# Patient Record
Sex: FEMALE | Race: WHITE | NOT HISPANIC OR LATINO | URBAN - METROPOLITAN AREA
[De-identification: names, ages, dates, MRNs, and addresses within clinical notes are randomized per-mention and may not be internally consistent; named-entity substitution may affect disease eponyms.]

---

## 2017-03-28 ENCOUNTER — OFFICE VISIT - RIVER FALLS (OUTPATIENT)
Dept: FAMILY MEDICINE | Facility: CLINIC | Age: 22
End: 2017-03-28

## 2017-03-28 ENCOUNTER — COMMUNICATION - RIVER FALLS (OUTPATIENT)
Dept: FAMILY MEDICINE | Facility: CLINIC | Age: 22
End: 2017-03-28

## 2017-03-28 ASSESSMENT — MIFFLIN-ST. JEOR: SCORE: 1151.6

## 2017-06-01 ENCOUNTER — OFFICE VISIT - RIVER FALLS (OUTPATIENT)
Dept: FAMILY MEDICINE | Facility: CLINIC | Age: 22
End: 2017-06-01

## 2017-06-01 ENCOUNTER — COMMUNICATION - RIVER FALLS (OUTPATIENT)
Dept: FAMILY MEDICINE | Facility: CLINIC | Age: 22
End: 2017-06-01

## 2017-06-01 ASSESSMENT — MIFFLIN-ST. JEOR: SCORE: 1160.67

## 2017-06-09 ENCOUNTER — OFFICE VISIT - RIVER FALLS (OUTPATIENT)
Dept: FAMILY MEDICINE | Facility: CLINIC | Age: 22
End: 2017-06-09

## 2017-06-09 ASSESSMENT — MIFFLIN-ST. JEOR: SCORE: 1163.39

## 2017-07-07 ENCOUNTER — APPOINTMENT (OUTPATIENT)
Dept: FAMILY MEDICINE | Facility: CLINIC | Age: 22
End: 2017-07-07
Payer: COMMERCIAL

## 2017-07-07 ENCOUNTER — OFFICE VISIT (OUTPATIENT)
Dept: FAMILY MEDICINE | Facility: CLINIC | Age: 22
End: 2017-07-07
Payer: COMMERCIAL

## 2017-07-07 DIAGNOSIS — F19.10 MULTIPLE SUBSTANCE ABUSE (H): ICD-10-CM

## 2017-07-07 DIAGNOSIS — Z86.59 HX OF PSYCHOSIS: ICD-10-CM

## 2017-07-07 DIAGNOSIS — N93.9 VAGINAL BLEEDING: Primary | ICD-10-CM

## 2017-07-07 LAB
BETA HCG QUAL IFA URINE: NEGATIVE
ERYTHROCYTE [DISTWIDTH] IN BLOOD BY AUTOMATED COUNT: 11.7 % (ref 10–15)
HCT VFR BLD AUTO: 43.7 % (ref 35–47)
HGB BLD-MCNC: 15.4 G/DL (ref 11.7–15.7)
MCH RBC QN AUTO: 32 PG (ref 26.5–33)
MCHC RBC AUTO-ENTMCNC: 35.2 G/DL (ref 31.5–36.5)
MCV RBC AUTO: 91 FL (ref 78–100)
MICRO REPORT STATUS: NORMAL
PLATELET # BLD AUTO: 238 10E9/L (ref 150–450)
RBC # BLD AUTO: 4.82 10E12/L (ref 3.8–5.2)
SPECIMEN SOURCE: NORMAL
WBC # BLD AUTO: 6.3 10E9/L (ref 4–11)
WET PREP SPEC: NORMAL

## 2017-07-07 PROCEDURE — 99203 OFFICE O/P NEW LOW 30 MIN: CPT | Performed by: NURSE PRACTITIONER

## 2017-07-07 PROCEDURE — 36415 COLL VENOUS BLD VENIPUNCTURE: CPT | Performed by: NURSE PRACTITIONER

## 2017-07-07 PROCEDURE — 99499 UNLISTED E&M SERVICE: CPT | Performed by: NURSE PRACTITIONER

## 2017-07-07 PROCEDURE — 84703 CHORIONIC GONADOTROPIN ASSAY: CPT | Performed by: NURSE PRACTITIONER

## 2017-07-07 PROCEDURE — 85027 COMPLETE CBC AUTOMATED: CPT | Performed by: NURSE PRACTITIONER

## 2017-07-07 RX ORDER — DIPHENOXYLATE HYDROCHLORIDE AND ATROPINE SULFATE 2.5; .025 MG/1; MG/1
1 TABLET ORAL
COMMUNITY
Start: 2017-07-06

## 2017-07-07 RX ORDER — HYDROXYZINE PAMOATE 25 MG/1
25-50 CAPSULE ORAL
COMMUNITY
Start: 2017-07-06

## 2017-07-07 RX ORDER — OLANZAPINE 2.5 MG/1
2.5 TABLET, FILM COATED ORAL
COMMUNITY
Start: 2017-07-06

## 2017-07-07 RX ORDER — SERTRALINE HYDROCHLORIDE 25 MG/1
25 TABLET, FILM COATED ORAL
COMMUNITY
Start: 2017-07-06

## 2017-07-07 RX ORDER — OLANZAPINE 10 MG/1
10 TABLET ORAL
COMMUNITY
Start: 2017-07-06

## 2017-07-07 ASSESSMENT — MIFFLIN-ST. JEOR: SCORE: 1170.01

## 2017-07-07 NOTE — NURSING NOTE
Chief Complaint   Patient presents with     Vaginal Bleeding       Initial /82 (BP Location: Right arm, Patient Position: Chair, Cuff Size: Adult Regular)  Pulse 88  Temp 98.5  F (36.9  C) (Tympanic)  Resp 18  Wt 99 lb 6.4 oz (45.1 kg)  LMP 06/20/2017  SpO2 98% There is no height or weight on file to calculate BMI.  Medication Reconciliation: complete    Health Maintenance that is potentially due pending provider review:  Patient is here from [place], [facility] physical

## 2017-07-07 NOTE — PATIENT INSTRUCTIONS
Your urine pregnancy was negative   Your blood work was negative   You are not pregnant      You should return to the clinic for a physical, pap and pelvic exam   I did not do this today as I feel would be best once she is physically and mentally stable

## 2017-07-07 NOTE — MR AVS SNAPSHOT
"              After Visit Summary   7/7/2017    Jane Peguero    MRN: [id number]           Patient Information     Date Of Birth          1995        Visit Information        Provider Department      7/7/2017 3:00 PM Alana Macias NP [facility]        Today's Diagnoses     Vaginal bleeding    -  1      Care Instructions    Your urine pregnancy was negative   Your blood work was negative   You are not pregnant      You should return to the clinic for a physical, pap and pelvic exam   I did not do this today as I feel would be best once she is physically and mentally stable          Follow-ups after your visit        Who to contact     If you have questions or need follow up information about today's clinic visit or your schedule please contact [facility] directly at [phone].  Normal or non-critical lab and imaging results will be communicated to you by [employer], letter or phone within 4 business days after the clinic has received the results. If you do not hear from us within 7 days, please contact the clinic through [employer] or phone. If you have a critical or abnormal lab result, we will notify you by phone as soon as possible.  Submit refill requests through [employer] or call your pharmacy and they will forward the refill request to us. Please allow 3 business days for your refill to be completed.          Additional Information About Your Visit        [employer] Information     GEOLID lets you send messages to your doctor, view your test results, renew your prescriptions, schedule appointments and more. To sign up, go to [email or web address] . Click on \"Log in\" on the left side of the screen, which will take you to the Welcome page. Then click on \"Sign up Now\" on the right side of the page.     You will be asked to enter the access code listed below, as well as some personal information. Please follow the directions to create your username and password.     Your " access code is: [email or web address]  Expires: 10/5/2017  3:45 PM     Your access code will  in 90 days. If you need help or a new code, please call your [facility] clinic or [phone].        Care EveryWhere ID     This is your Care EveryWhere ID. This could be used by other organizations to access your [facility] medical records  [id number]        Your Vitals Were     Pulse Temperature Respirations Last Period Pulse Oximetry       88 98.5  F (36.9  C) (Tympanic) 18 2017 98%        Blood Pressure from Last 3 Encounters:   17 120/82    Weight from Last 3 Encounters:   17 99 lb 6.4 oz (45.1 kg)              We Performed the Following     Beta HCG qual IFA urine     CBC with platelets     Wet prep        Primary Care Provider    None Specified       No primary provider on file.        Equal Access to Services     PALMA SINCLAIR : Hadii roscoe dhillon Soavery, waaxda luqadaha, qaybta kaalmada alberta, nolberto ny . So [facility] [phone].    ATENCIÓN: Si habla español, tiene a roman disposición servicios gratuitos de asistencia lingüística. Llame al [phone].    We comply with applicable federal civil rights laws and [place] laws. We do not discriminate on the basis of race, color, national origin, age, disability sex, sexual orientation or gender identity.            Thank you!     Thank you for choosing [facility]  for your care. Our goal is always to provide you with excellent care. Hearing back from our patients is one way we can continue to improve our services. Please take a few minutes to complete the written survey that you may receive in the mail after your visit with us. Thank you!             Your Updated Medication List - Protect others around you: Learn how to safely use, store and throw away your medicines at www.disposemymeds.org.      Notice  As of 2017  3:45 PM    You have not been prescribed any medications.

## 2017-07-07 NOTE — PROGRESS NOTES
"  SUBJECTIVE:                                                    Jane Peguero is a 21 year old female who presents to clinic today for the following health issues:      Patient came in for Catarizm physical  Stated she was bleeding  She believes that she is having a miscarriage  Tells me that she is unsure how far along she is   Reports that she is passing pieces of fetus  Bleeding started on 7/4/2017  It has been off and on  It has been light and then very heavy  Believes LMP was 6/20/2017    [facility] prior to me going in to see the patient   She tells me that the patient was on a 72 hour hold at [facility] for acute psychosis related to \"juan carlos dust\" . She states that there she had similar complaints  I reviewed her care everywhere records which confirmed this   She had a negative pelvic ultrasound 6/7/2017  I have checked labs today       Problem list and histories reviewed & adjusted, as indicated.  Additional history: as documented    Labs reviewed in EPIC    Reviewed and updated as needed this visit by clinical staff       Reviewed and updated as needed this visit by Provider         ROS:  Constitutional, HEENT, cardiovascular, pulmonary, gi and gu systems are negative, except as otherwise noted.    OBJECTIVE:                                                    /82 (BP Location: Right arm, Patient Position: Chair, Cuff Size: Adult Regular)  Pulse 88  Temp 98.5  F (36.9  C) (Tympanic)  Resp 18  Wt 99 lb 6.4 oz (45.1 kg)  LMP 06/20/2017  SpO2 98%  There is no height or weight on file to calculate BMI.  GENERAL APPEARANCE: healthy, alert and no distress  RESP: lungs clear to auscultation - no rales, rhonchi or wheezes  CV: regular rates and rhythm, normal S1 S2, no S3 or S4 and no murmur, click or rub  ABDOMEN: soft, nontender, without hepatosplenomegaly or masses and bowel sounds normal  SKIN: pale and clammy   PSYCH: mentation appears abnormal denies insight into " recent hospitalization, affect is withdrawn and flat, does not track well     Diagnostic test results:  Diagnostic Test Results:  Results for orders placed or performed in visit on 07/07/17   Beta HCG qual IFA urine   Result Value Ref Range    Beta HCG Qual IFA Urine Negative NEG   CBC with platelets   Result Value Ref Range    WBC 6.3 4.0 - 11.0 10e9/L    RBC Count 4.82 3.8 - 5.2 10e12/L    Hemoglobin 15.4 11.7 - 15.7 g/dL    Hematocrit 43.7 35.0 - 47.0 %    MCV 91 78 - 100 fl    MCH 32.0 26.5 - 33.0 pg    MCHC 35.2 31.5 - 36.5 g/dL    RDW 11.7 10.0 - 15.0 %    Platelet Count 238 150 - 450 10e9/L   Wet prep   Result Value Ref Range    Specimen Description Vagina     Wet Prep       Canceled, Test credited  Test canceled by physician      Micro Report Status FINAL 07/07/2017         ASSESSMENT/PLAN:                                                      1. Vaginal bleeding    2. Hx of psychosis    3. Multiple substance abuse      Patient came in for [facility] and was concerned that she was pregnant and having a miscarriage   Recent hx of psych admission due to psychosis  At treatment center now   I provided reassurance that she is not pregnant  I opted not to do a pelvic exam today in light of her current mental status  I have recommend that this be done in the near future     Patient Instructions   Your urine pregnancy was negative   Your blood work was negative   You are not pregnant      You should return to the clinic for a physical, pap and pelvic exam   I did not do this today as I feel would be best once she is physically and mentally stable      Alana Macias NP  [facility]

## 2017-07-10 ENCOUNTER — NURSE TRIAGE (OUTPATIENT)
Dept: NURSING | Facility: CLINIC | Age: 22
End: 2017-07-10

## 2017-07-11 NOTE — TELEPHONE ENCOUNTER
Clinic Action Needed: None  Reason for Call: Mother called to inquire if patient was in the hospital.  According to patient's chart, there is no record of any hospitalization.  Mother was advised to call another chandni system.  Routed to: HANS Stanley RN  [facility]  [phone]

## 2018-04-17 ENCOUNTER — OFFICE VISIT - RIVER FALLS (OUTPATIENT)
Dept: FAMILY MEDICINE | Facility: CLINIC | Age: 23
End: 2018-04-17
Payer: COMMERCIAL

## 2018-04-17 ASSESSMENT — MIFFLIN-ST. JEOR: SCORE: 1310.36

## 2018-05-01 ENCOUNTER — OFFICE VISIT - RIVER FALLS (OUTPATIENT)
Dept: FAMILY MEDICINE | Facility: CLINIC | Age: 23
End: 2018-05-01
Payer: COMMERCIAL

## 2018-05-01 ASSESSMENT — MIFFLIN-ST. JEOR: SCORE: 1294.94

## 2018-05-23 ENCOUNTER — OFFICE VISIT - RIVER FALLS (OUTPATIENT)
Dept: FAMILY MEDICINE | Facility: CLINIC | Age: 23
End: 2018-05-23
Payer: COMMERCIAL

## 2018-05-23 ASSESSMENT — MIFFLIN-ST. JEOR: SCORE: 1301.29

## 2018-07-11 ENCOUNTER — OFFICE VISIT - RIVER FALLS (OUTPATIENT)
Dept: FAMILY MEDICINE | Facility: CLINIC | Age: 23
End: 2018-07-11
Payer: COMMERCIAL

## 2018-07-11 ASSESSMENT — MIFFLIN-ST. JEOR: SCORE: 1293.69

## 2018-10-03 ENCOUNTER — OFFICE VISIT - RIVER FALLS (OUTPATIENT)
Dept: FAMILY MEDICINE | Facility: CLINIC | Age: 23
End: 2018-10-03
Payer: COMMERCIAL

## 2018-10-10 ENCOUNTER — OFFICE VISIT - RIVER FALLS (OUTPATIENT)
Dept: FAMILY MEDICINE | Facility: CLINIC | Age: 23
End: 2018-10-10
Payer: COMMERCIAL

## 2018-10-10 ASSESSMENT — MIFFLIN-ST. JEOR: SCORE: 1239.26

## 2018-11-29 ENCOUNTER — OFFICE VISIT - RIVER FALLS (OUTPATIENT)
Dept: FAMILY MEDICINE | Facility: CLINIC | Age: 23
End: 2018-11-29
Payer: COMMERCIAL

## 2019-05-03 ENCOUNTER — COMMUNICATION - RIVER FALLS (OUTPATIENT)
Dept: FAMILY MEDICINE | Facility: CLINIC | Age: 24
End: 2019-05-03

## 2019-05-06 ENCOUNTER — COMMUNICATION - RIVER FALLS (OUTPATIENT)
Dept: FAMILY MEDICINE | Facility: CLINIC | Age: 24
End: 2019-05-06

## 2019-06-28 VITALS
TEMPERATURE: 98.5 F | DIASTOLIC BLOOD PRESSURE: 82 MMHG | BODY MASS INDEX: 17.61 KG/M2 | RESPIRATION RATE: 18 BRPM | SYSTOLIC BLOOD PRESSURE: 120 MMHG | HEIGHT: 63 IN | WEIGHT: 99.4 LBS | HEART RATE: 88 BPM | OXYGEN SATURATION: 98 %

## 2021-06-16 PROBLEM — F29 PSYCHOSIS (H): Status: ACTIVE | Noted: 2018-11-05

## 2021-06-16 PROBLEM — F32.A DEPRESSION: Status: ACTIVE | Noted: 2018-11-05

## 2022-02-11 VITALS
DIASTOLIC BLOOD PRESSURE: 72 MMHG | BODY MASS INDEX: 17.79 KG/M2 | SYSTOLIC BLOOD PRESSURE: 112 MMHG | TEMPERATURE: 96 F | HEIGHT: 63 IN | SYSTOLIC BLOOD PRESSURE: 112 MMHG | HEIGHT: 63 IN | HEART RATE: 64 BPM | WEIGHT: 102.4 LBS | BODY MASS INDEX: 18.14 KG/M2 | HEART RATE: 62 BPM | DIASTOLIC BLOOD PRESSURE: 64 MMHG | WEIGHT: 100.4 LBS

## 2022-02-12 VITALS
WEIGHT: 133.4 LBS | DIASTOLIC BLOOD PRESSURE: 78 MMHG | HEIGHT: 63 IN | WEIGHT: 135.4 LBS | BODY MASS INDEX: 23.39 KG/M2 | TEMPERATURE: 99 F | BODY MASS INDEX: 23.64 KG/M2 | HEART RATE: 82 BPM | DIASTOLIC BLOOD PRESSURE: 80 MMHG | OXYGEN SATURATION: 98 % | HEART RATE: 90 BPM | HEART RATE: 70 BPM | SYSTOLIC BLOOD PRESSURE: 104 MMHG | BODY MASS INDEX: 23.99 KG/M2 | HEIGHT: 63 IN | SYSTOLIC BLOOD PRESSURE: 118 MMHG | TEMPERATURE: 98.7 F | SYSTOLIC BLOOD PRESSURE: 118 MMHG | DIASTOLIC BLOOD PRESSURE: 68 MMHG | HEIGHT: 63 IN | TEMPERATURE: 98.8 F | WEIGHT: 132 LBS

## 2022-02-12 VITALS
HEART RATE: 85 BPM | WEIGHT: 103 LBS | TEMPERATURE: 98.8 F | HEIGHT: 63 IN | SYSTOLIC BLOOD PRESSURE: 126 MMHG | DIASTOLIC BLOOD PRESSURE: 83 MMHG | BODY MASS INDEX: 18.25 KG/M2

## 2022-02-12 VITALS
OXYGEN SATURATION: 97 % | HEIGHT: 62 IN | BODY MASS INDEX: 24.4 KG/M2 | HEART RATE: 85 BPM | DIASTOLIC BLOOD PRESSURE: 76 MMHG | WEIGHT: 132.6 LBS | SYSTOLIC BLOOD PRESSURE: 108 MMHG | TEMPERATURE: 98.3 F

## 2022-02-12 VITALS
HEIGHT: 62 IN | SYSTOLIC BLOOD PRESSURE: 130 MMHG | HEART RATE: 97 BPM | SYSTOLIC BLOOD PRESSURE: 148 MMHG | DIASTOLIC BLOOD PRESSURE: 80 MMHG | OXYGEN SATURATION: 97 % | WEIGHT: 122 LBS | BODY MASS INDEX: 21.61 KG/M2 | TEMPERATURE: 98.5 F | BODY MASS INDEX: 22.19 KG/M2 | DIASTOLIC BLOOD PRESSURE: 88 MMHG | HEART RATE: 88 BPM | TEMPERATURE: 100 F | WEIGHT: 120.6 LBS

## 2022-02-16 NOTE — LETTER
(Inserted Image. Unable to display)     July 08, 2019      EZRA CASE  [address], [place] [id number]          Dear EZRA,      Thank you for selecting [facility] (previously [facility]) for your healthcare needs.      Our records indicate you are due for the following services:     Follow-up office visit.      To schedule an appointment or if you have further questions, please contact your primary clinic:   [facility]       [phone]   [facility]       [phone]              [facility]     [phone]      Powered by [employer]    Sincerely,    Yelitza Champagne MD

## 2022-02-16 NOTE — PROGRESS NOTES
Patient:   EZRA CASE            MRN: [id number]            FIN: [id number]               Age:   21 years     Sex:  Female     :  1995   Associated Diagnoses:   Abdominal and pelvic pain   Author:   Chet Lindsey MD      Visit Information      Date of Service: 2017 01:26 pm  Performing Location: [facility]  Encounter#: [id number]      Primary Care Provider (PCP):  Ronal Peters MD    NPI# [id number]      Referring Provider:  Chet Lindsey MD    NPI# [id number]      Chief Complaint   2017 1:32 PM CDT     Consult per ZIM to discuss LLQ discomfort off and on past 1 month.  Also noticing increased urinary frequency at times, and abnormal vaginal discharge.      Interval History   The patient is seen in consultation for Dr. Peters regarding lower abdominal pain.  She has had issues since having termination of pregnancy back in February.  She presented with pain following that procedure and was treated with antibiotics for presumed endometritis.  She has a second round of antibiotics when she had a return of pain.  She seemed to be doing fine until around one or two weeks ago when she began having pain and increased vaginal discharge.  She has been seen by Dr. Peters on a couple of occasions and was sent for ultrasound on 2017 which was normal.  The patient denies fever.  The discharge was whitish in color and increased.  She has not been sexually active since her .  She ranks her pain 8/10 and is mostly lower abdomen and probably a little bit off to the left side.  A couple of days after the pain started she began having some looser stools combined with constipation at times.  She also feels a bit nauseated.       Review of Systems   Review  of systems is negative except as documented under interval history.      Health Status   Allergies:    Allergic Reactions (Selected)  No Known Medication Allergies   Medications:  (Selected)    Prescriptions  Prescribed  Augmentin 875 mg oral tablet: 1 tab(s), PO, q12hr, # 28 tab(s), 0 Refill(s), Type: Maintenance, Pharmacy: [id number], 1 tab(s) po q12 hrs,x14 day(s)   Problem list:    All Problems  Tobacco user / SNOMED CT 871832693 / Probable      Histories   Past Medical History:    No active or resolved past medical history items have been selected or recorded.   Family History:    High blood pressure  Father  Grandfather (M)     Procedure history:    No previous hospitalizations.   Social History:        Tobacco Assessment            Current every day smoker, Cigarettes, 5 per day.        Physical Examination   Vital Signs   6/9/2017 1:32 PM CDT Temperature Tympanic 98.8 DegF    Peripheral Pulse Rate 85 bpm    HR Method Electronic    Systolic Blood Pressure 126 mmHg    Diastolic Blood Pressure 83 mmHg    Mean Arterial Pressure 97 mmHg    BP Site Right arm    BP Method Electronic      Gastrointestinal:       Abdomen: Abdomen is soft and minimally tender.  There is no guarding..    Gynecologic:  External genitalia, vagina, and cervix are normal.  There is a small amount of blood in the vaginal vault.  Uterus is small and nontender with no adnexal masses..       Impression and Plan   Diagnosis     Abdominal and pelvic pain (XTF05-UO R10).     Lower abdominal pain combined with increased vaginal discharge with some bleeding today suggests possible endometritis..     Plan:  The patient will be treated with another round of antibiotics.  I am not highly suspicious for anything severe as far as endometritis but just to be on the safe side we will be aggressive with antibiotics.  She will return next week if not improving after a few days of antibiotics but Augmentin was sent into her pharmacy.  She will not be placed on contraceptives at her request..    Patient Instructions:       Counseled: Regarding diagnosis, Regarding treatment, Regarding medications, Verbalized understanding.

## 2022-02-16 NOTE — PROGRESS NOTES
Patient:   EZRA CASE            MRN: [id number]            FIN: [id number]               Age:   21 years     Sex:  Female     :  1995   Associated Diagnoses:   Abdominal pain; Vaginal discharge   Author:   Ronal Peters MD      Chief Complaint   2017 2:56 PM CDT     c/o foul oder, abdominal pain.      History of Present Illness   see chief complaint as noted above and confirmed with the patient   21 year old female presents with lower abdominal pain. Has had fever and chill. denies sexual activities. she had ellective termination of pregnancy in [facility].  she did have post procedure testing for std that was negative.No sexual activity since.  Has had some rashes on her left wrist. She is not currently on BCP her last period was last month.     also notes trouble with her speech.  she has trouble repeating words and also with enuciation.  she wonders if there is somewhere she could go to get help      Review of Systems   Constitutional:  Fever, Chills.    Gastrointestinal:       Abdominal pain: Bilateral, The pain is mild.    Gynecologic:  Last menstrual period:  2017, Vaginal odor.         Vaginal discharge: Small amount.    Musculoskeletal:  No back pain.    Integumentary:  Rash.    Neurologic:  No headache.              Health Status   Allergies:    Allergic Reactions (Selected)  No Known Medication Allergies   Medications:  (Selected)      Problem list:    All Problems  Tobacco user / SNOMED CT 906896399 / Probable      Histories   Past Medical History:    No active or resolved past medical history items have been selected or recorded.   Family History:    High blood pressure  Father  Grandfather (M)     Procedure history:    No previous hospitalizations.   Social History:        Tobacco Assessment            Current every day smoker, Cigarettes, 5 per day.        Physical Examination   Vital Signs   2017 2:56 PM CDT Peripheral Pulse Rate 64 bpm    Systolic Blood Pressure 112 mmHg     Diastolic Blood Pressure 72 mmHg    Mean Arterial Pressure 85 mmHg      Measurements from flowsheet : Measurements   6/1/2017 2:56 PM CDT Height Measured - Standard 62.5 in    Weight Measured - Standard 102.4 lb    BSA 1.43 m2    Body Mass Index 18.43 kg/m2      General:  Alert and oriented, No acute distress.    Eye:  Pupils are equal, round and reactive to light, Normal conjunctiva.    HENT:  Oral mucosa is moist.    Neck:  Supple.    Respiratory:  Respirations are non-labored.    Cardiovascular:  Normal rate, Regular rhythm, No edema.    Gastrointestinal:  Non-distended.    Musculoskeletal:  Normal gait.    Integumentary:  Warm, No rash.    Psychiatric:  Cooperative, Appropriate mood & affect, Normal judgment.       Review / Management   Results review:  Lab results   6/1/2017 3:26 PM CDT U Pregnancy Test Negative    UA Color Yellow    UA Clarity Clear    UA pH 6.0    UA Specific Gravity 1.010    UA Glucose Negative mg/dL    UA Bilirubin Negative    UA Ketones Negative mg/dL    Urine Occult Blood Negative    UA Protein Negative mg/dL    UA Nitrite Negative    UA Leukocyte Esterase Trace    UA Urobilinogen Normal    UA Epithelial Cells Few    UA Renal Epithelial Cells Few    UA Mucous Present    UA WBC 3-5    UA RBC 0-2    UA Bacteria Few   .       Impression and Plan   Diagnosis     Abdominal pain (QVO26-RY R10.9).     Vaginal discharge (PZP98-RE N89.8).     Course:  discussed with her symptoms, risk factors, birth control but i am unsure if she understood well.  will have her be reseen and reinforce good care..    Plan:  Will have her get a pelvis ultrasound and follow up with Imani Garcia.  I, Willow Nicholson [facility], acted solely as a scribe for, and in the presence of Dr. Ronal Peters who performed the service..

## 2022-02-16 NOTE — TELEPHONE ENCOUNTER
Entered by Elmer Kelley CMA on May 06, 2019 10:36:51 AM CDT  ---------------------  From: Elmer Kelley CMA   To: [place], [place]    Sent: 5/6/2019 10:36:47 AM CDT  Subject: Medication Management     ** Not Approved: Patient has requested refill too soon, Pt given #84 5-3-19 **  ethinyl estradiol-levonorgestrel (AVIANE 0.1-0.02MG TABLET)  TAKE ONE TABLET BY MOUTH EVERY DAY AT 8AM  Qty:  30 tab(s)        Days Supply:  28        Refills:  0          Substitutions Allowed     Route To Pharmacy - [place], [place]   Note from Pharmacy:  FACILITY REQUESTING REFILL. PATIENT IS OUT AND NEEDS THIS DELIVERED ASAP. 2ND REQUEST  Signed by Elmer Kelley CMA            ** Patient matched by Elmer Kelley CMA on 5/6/2019 10:35:58 AM CDT **      ------------------------------------------  From: [place], [place]  To: Ronal Peters MD  Sent: May 3, 2019 1:06:01 PM CDT  Subject: Medication Management  Due: May 4, 2019 1:06:01 PM CDT    ** On Hold Pending Signature **  Drug: ethinyl estradiol-levonorgestrel (Aviane 100 mcg-20 mcg oral tablet)  TAKE ONE TABLET BY MOUTH EVERY DAY AT 8AM  Quantity: 30 EA       Days Supply: 0         Refills: 0  Substitutions Allowed  Notes from Pharmacy:     Dispensed Drug: ethinyl estradiol-levonorgestrel (Aviane 100 mcg-20 mcg oral tablet)  TAKE ONE TABLET BY MOUTH EVERY DAY AT 8AM  Quantity: 30 tab(s)     Days Supply: 28        Refills: 0  Substitutions Allowed  Notes from Pharmacy: FACILITY REQUESTING REFILL. PATIENT IS OUT AND NEEDS THIS DELIVERED ASAP. 2ND REQUEST  ------------------------------------------

## 2022-02-16 NOTE — PROGRESS NOTES
Patient:   EZRA CASE            MRN: [id number]            FIN: [id number]               Age:   21 years     Sex:  Female     :  1995   Associated Diagnoses:   Vaginal discharge   Author:   Ronal Peters MD      Chief Complaint   3/28/2017 9:15 AM CDT    c/o yellow discharge, fever with chills, sweating, left tonsil is swollen x 2 weeks      History of Present Illness   see chief complaint as noted above and confirmed with the patient   21 year old female presenting with thick yellow discharge, discharge has mucus in it and is foul smelling.  Has had some slight tenderness in left side of abdomen. Had an  in [facility]. She had  done by pill. Symptoms started 2 weeks ago. She has had a fever with chills.      Review of Systems   Constitutional:  Fever, Chills, Sweats.    Ear/Nose/Mouth/Throat:  Negative.    Respiratory:  Negative.    Cardiovascular:  Negative.    Gastrointestinal:       Abdominal pain: Left, The pain is mild, Characterized as ( Intermittent ).    Gynecologic:  Vaginal odor, Vaginal itching.         Vaginal discharge: Large amount, Yellow, Foul smelling, Thick.    Musculoskeletal:  No back pain.    Integumentary:  No rash.    Neurologic:  No headache.              Health Status   Allergies:    Allergic Reactions (Selected)  No Known Medication Allergies   Medications:  (Selected)      Problem list:    All Problems  Tobacco user / SNOMED CT [id number] / Probable      Histories   Past Medical History:    No active or resolved past medical history items have been selected or recorded.   Family History:    High blood pressure  Father  Grandfather (M)     Procedure history:    No previous hospitalizations.   Social History:        Tobacco Assessment            Current every day smoker, Cigarettes, 5 per day.        Physical Examination   Vital Signs   3/28/2017 9:15 AM CDT Temperature Tympanic 96.0 DegF  LOW    Peripheral Pulse Rate 62 bpm    Systolic Blood Pressure 112 mmHg     Diastolic Blood Pressure 64 mmHg    Mean Arterial Pressure 80 mmHg      Measurements from flowsheet : Measurements   3/28/2017 9:15 AM CDT Height Measured - Standard 62.5 in    Weight Measured - Standard 100.4 lb    BSA 1.42 m2    Body Mass Index 18.07 kg/m2      General:  Alert and oriented, No acute distress.    Eye:  Pupils are equal, round and reactive to light, Normal conjunctiva.    HENT:  Normocephalic, Tympanic membranes are clear, Oral mucosa is moist, No pharyngeal erythema.    Neck:  Supple, Non-tender, No lymphadenopathy.    Respiratory:  Lungs are clear to auscultation, Respirations are non-labored.    Cardiovascular:  Normal rate, Regular rhythm, No edema.    Gastrointestinal:  Soft, Non-tender, Non-distended, No organomegaly.    Genitourinary:  No inguinal tenderness, No lesions, Mild tenderness in the lower midline no masses are appreciated on bimanual.         Vagina: Mucosa ( Within normal limits ).         Cervix: Lesions.         Ovaries: Within normal limits.         Adnexa: Not tender.    Musculoskeletal:  Normal range of motion, Normal strength, No swelling, Normal gait.    Integumentary:  Warm, No rash.    Neurologic:  Alert, Oriented.    Psychiatric:  Cooperative, Appropriate mood & affect, Normal judgment.       Review / Management   Results review:  Lab results   3/28/2017 10:13 AM CDT Wet Prep Yeast None Seen    Wet Prep Trichomonas None Seen    Wet Prep Clue Cells None Seen   .       Impression and Plan   Diagnosis     Vaginal discharge (VKF14-CY N89.8).     Course:  No real pain, no fever or chills.    Plan:  Given shot of Ceftriaxone in office and sent in a prescription for azithromycin 250mg 4 tablets once. Sent specimens to lab, will call her with results.  Willow BURGESS [facility], acted solely as a scribe for, and in the presence of Dr. Ronal Peters who performed the service..    Orders     Orders (Selected)   Outpatient Orders  Ordered  Chlamydia and Gonoccocus Probe  (Request): Vaginal discharge  Thin Prep PAP (Request): Vaginal discharge  Completed  Wet Prep Vaginal (Request): Priority: Urgent, Vaginal discharge.

## 2022-02-16 NOTE — PROGRESS NOTES
Patient:   EZRA CASE            MRN: [id number]            FIN: [id number]               Age:   22 years     Sex:  Female     :  1995   Associated Diagnoses:   Acute sinusitis; History of psychosis   Author:   Ronal Peters MD      Chief Complaint   2018 11:03 AM CDT   Sinus pressure, productive cough, headaches, bodyaches, started at begining of the month.      History of Present Illness   This 22 year old is here because of several days of headache, cough, and lots of sinus pressure.  She says it really started a month ago but has gotten worse especially the last four or five days.  Now she is having headache, green-yellow discharge from her nose, and she is also cough.  She hasn t been short of breath.  The patient confirms that she had significant health issues last summer including some psychosis and drug abuse.  She is no longer abusing drugs and her mood has been good.           Review of Systems   Constitutional:  Negative except as documented in history of present illness.    Respiratory:  Negative except as documented in history of present illness.    Gastrointestinal:  Negative except as documented in history of present illness.    Integumentary:  No rash.       Health Status   Allergies:    Allergic Reactions (Selected)  No Known Medication Allergies   Medications:  (Selected)   Prescriptions  Prescribed  amoxicillin 875 mg oral tablet: 1 tab(s) ( 875 mg ), PO, BID, # 20 tab(s), 0 Refill(s), Type: Maintenance, Pharmacy: [facility], 1 tab(s) po bid,x10 day(s)  predniSONE 10 mg oral tablet: 1 tab(s) ( 10 mg ), PO, Daily, # 5 tab(s), 0 Refill(s), Type: Maintenance, Pharmacy: [facility], 1 tab(s) po daily,x5 day(s)   Problem list:    All Problems (Selected)  Tobacco user / 960270226 / Probable  Psychotic disorder / 705269037 / Confirmed  Cannabis use with psychotic disorder / 6946207983 / Confirmed  Alcohol use disorder, severe, dependence / 8390812482 /  Confirmed  Chemical dependency / 768957965 / Confirmed  Polysubstance abuse / 5233241736 / Confirmed      Histories   Past Medical History:    Active  Psychotic disorder (603926291)  Cannabis use with psychotic disorder (3412371458)  Alcohol use disorder, severe, dependence (6907241299)  Chemical dependency (162230641)  Polysubstance abuse (2623341032)  Resolved  Inpatient stay (409419095): Onset on 7/14/2017 at 22 years.  Resolved on 8/7/2017 at 22 years.  Comments:  8/22/2017 CDT 7:49 AM Gemini Lawrence  @[facility], [place] - Severe episode of recurrent major depressive disorder, with psychotic features.  Inpatient stay (711107754): Onset on 6/21/2017 at 21 years.  Resolved on 7/6/2017 at 21 years.  Comments:  7/17/2017 CDT 8:24 AM Gemini Lawrence  @[facility] - Psychotic disorder not otherwise specified   Family History:    High blood pressure  Father  Grandfather (M)     Procedure history:    No previous hospitalizations.   Social History:        Tobacco Assessment            Current every day smoker, Cigarettes, 5 per day.        Physical Examination   Vital Signs   4/17/2018 11:03 AM CDT Temperature Tympanic 98.8 DegF    Peripheral Pulse Rate 70 bpm    HR Method Electronic    Systolic Blood Pressure 104 mmHg    Diastolic Blood Pressure 80 mmHg    Mean Arterial Pressure 88 mmHg    BP Site Right arm    BP Method Manual    Oxygen Saturation 98 %      Measurements from flowsheet : Measurements   4/17/2018 11:03 AM CDT Height Measured - Standard 62.5 in    Weight Measured - Standard 135.4 lb    BSA 1.64 m2    Body Mass Index 24.37 kg/m2      General:  Alert and oriented, No acute distress.    HENT:  percussion tenderness right maxillary sinus.    Neck:  Non-tender, No lymphadenopathy.    Respiratory:  Lungs are clear to auscultation, Respirations are non-labored.    Integumentary:  Warm, No rash.    Psychiatric:  Cooperative, Appropriate mood & affect, Normal judgment.       Impression and  Plan   Diagnosis     Acute sinusitis (HLG28-XG J01.90).     History of psychosis (YVT26-HK Z86.59).     Orders     Orders (Selected)   Prescriptions  Prescribed  amoxicillin 875 mg oral tablet: 1 tab(s) ( 875 mg ), PO, BID, # 20 tab(s), 0 Refill(s), Type: Maintenance, Pharmacy: [facility], 1 tab(s) po bid,x10 day(s)  predniSONE 10 mg oral tablet: 1 tab(s) ( 10 mg ), PO, Daily, # 5 tab(s), 0 Refill(s), Type: Maintenance, Pharmacy: [facility], 1 tab(s) po daily,x5 day(s).     I am going to use a little bit of prednisone because of her long symptoms.  I have discussed potential allergies that can occur and I relate these symptoms.  We also talked about side effects of prednisone but there is no psychosis or significant mood problems.  She will use some amoxicillin along with steam such as a Neti pot..

## 2022-02-16 NOTE — PROGRESS NOTES
Patient:   EZRA CASE            MRN: [id number]            FIN: [id number]               Age:   22 years     Sex:  Female     :  1995   Associated Diagnoses:   Acute vaginitis; Encounter for screening for infections with a predominantly sexual mode of transmission; Tobacco abuse counseling   Author:   Imani Stoner      Visit Information      Date of Service: 2018 08:20 am  Performing Location: [facility]  Encounter#: [id number]      Primary Care Provider (PCP):  Ronal Peters MD# [id number]   Visit type:  General concerns.    Source of history:  Self.    History limitation:  None.       Chief Complaint   2018 8:25 AM CDT     Feels like she has a yeast infection.        History of Present Illness   Ezra is here with complaints of itchy, chunky, white vaginal discharge x 5 days.  She recently used antibiotics.  She has had vaginal yeast in the past and thinks she may have a yeast infection again.  She is sexually active.  Had elective  in 2017.  Does not desire pregnancy in the next year.  Uses condoms sometimes, not always.    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  Smokes 3-4 packs of cigarettes per week.  Started many years ago.  Would like to use nicotine patch to help her quit.      Review of Systems   ROS negative except as noted in HPI.      Health Status   Allergies:    Allergic Reactions (Selected)  No Known Medication Allergies   Problem list:    All Problems  Cannabis use with psychotic disorder / SNOMED CT 1027808415 / Confirmed  Chemical dependency / SNOMED CT 738738007 / Confirmed  Polysubstance abuse / SNOMED CT 7214767276 / Confirmed  Psychotic disorder / SNOMED CT 316261415 / Confirmed  Alcohol use disorder, severe, dependence / SNOMED CT 7046768308 / Confirmed  Tobacco user / SNOMED CT 617641309 / Probable  Resolved: Inpatient stay / SNOMED CT 269658693  Resolved: Inpatient stay / SNOMED  CT 199843778   Medications:  (Selected)   Prescriptions  Prescribed  Diflucan 150 mg oral tablet: 1 tab(s) ( 150 mg ), PO, Once, # 1 tab(s), 1 Refill(s), Type: Soft Stop, Pharmacy: [facility] [id number] [facility], 1 tab(s) po once,    Medications          *denotes recorded medication          Diflucan 150 mg oral tablet: 150 mg, 1 tab(s), PO, Once, 1 tab(s), 1 Refill(s).        Histories   Past Medical History:    Active  Psychotic disorder (307103156)  Cannabis use with psychotic disorder (7397823688)  Alcohol use disorder, severe, dependence (2068758029)  Chemical dependency (032378116)  Polysubstance abuse (7397046161)  Resolved  Inpatient stay (442464828): Onset on 7/14/2017 at 22 years.  Resolved on 8/7/2017 at 22 years.  Comments:  8/22/2017 CDT 7:49 AM Gemini Lawrence  @[facility], [place] - Severe episode of recurrent major depressive disorder, with psychotic features.  Inpatient stay (512880026): Onset on 6/21/2017 at 21 years.  Resolved on 7/6/2017 at 21 years.  Comments:  7/17/2017 CDT 8:24 AM Gemini Lawrence  @[facility] - Psychotic disorder not otherwise specified   Family History:    High blood pressure  Father  Grandfather (M)     Procedure history:    No previous hospitalizations.   Social History:        Tobacco Assessment            Current every day smoker, Cigarettes, 5 per day.      Employment and Education Assessment            Employed, Work/School description: works in a photo shop.  Previous employment/school: going to school to               become dental hygenist.      Sexual Assessment            Sexually active: Yes.  Contraceptive Use Details: Condoms.  Other contraceptive use: h/o ocp use.        Physical Examination   Last Menstrual Period: 4/13/2018     Vital Signs   5/1/2018 8:25 AM CDT Temperature Tympanic 98.7 DegF    Peripheral Pulse Rate 90 bpm    Pulse Site Radial artery    HR Method Manual    Systolic Blood Pressure 118 mmHg    Diastolic Blood Pressure 78 mmHg    Mean  Arterial Pressure 91 mmHg    BP Site Right arm    BP Method Manual      Measurements from flowsheet : Measurements   5/1/2018 8:25 AM CDT Height Measured - Standard 62.5 in    Weight Measured - Standard 132 lb    BSA 1.62 m2    Body Mass Index 23.76 kg/m2      General:  Alert and oriented, No acute distress.    Eye:  Normal conjunctiva.    HENT:  Normocephalic, Normal hearing.    Respiratory:  Respirations are non-labored.    Cardiovascular:  Normal rate.    Genitourinary:          Labia: Bilateral, Minora, Erythema.         Vagina: Discharge ( White ), Mucosa ( Erythema ), No bleeding, No laceration, No lesions, No mass.         Cervix: Discharge.    Musculoskeletal:  Normal gait.    Integumentary:  Warm, Dry.    Neurologic:  Normal sensory.    Psychiatric:  Cooperative, Appropriate mood & affect, Normal judgment.       Health Maintenance   due to Tetanus shot      Review / Management   Results review      Impression and Plan   Diagnosis     Acute vaginitis (GRI07-DF N76.0).     Encounter for screening for infections with a predominantly sexual mode of transmission (KCD86-BU Z11.3).     Tobacco abuse counseling (GZS43-BO Z71.6).     Plan   Not interested in discussing contraceptive options.  Will continue condom use.  Wet prep and GC, CT swab collected today.  Rx for Diflucan.  Advised to eat yogurt daily.  Tetanus vaccine today.  Gave and reviewed tobacco cessation folder.  RTC to see primary provider for nicotine patch.

## 2022-02-16 NOTE — TELEPHONE ENCOUNTER
"---------------------  From: Jacqueline Clay (eRx Pool ([id number]_[facility]))   To: [facility] Message Pool ([address]_[place] [facility] [place]);     Sent: 5/1/2019 3:00:49 PM CDT  Subject: ocp refill?     PCP: SHARON, but last ocp prescribed by [facility] when seen on 7/11/18 for px/contraception. I just received fax from [facility] wanting refill of Aviane ocp that was \"previously prescribed by Fahad Greene PA-C\". Renetta was also sent years worth on 7/11/18, unsure of what ocp she is supposed to be taking (or are they the same thing?). Please advise.---------------------  From: Noemy Germain CMA ([facility] Message Pool ([address]))   To: Yelitza Champagne MD;     Sent: 5/1/2019 3:01:37 PM CDT  Subject: FW: ocp refill?I think pt switched pharmacies so just needs new Rx sent to [facility]. Okay for Aviane or Renetta?---------------------  From: Yelitza Champagne MD   To: [facility] Message Pool ([address]_[place] [facility] [place]);     Sent: 5/3/2019 2:27:07 PM CDT  Subject: RE: ocp refill?     please clarify which OCP pt is on, then ok to send 3 month supply and invite pt to rtc in July for follow up, thanksSpoke to patient at 1436. Patient states she is taking Aviane and [facility] I informed patient to rtc in July, she verbalized understanding.  "

## 2022-02-16 NOTE — PROGRESS NOTES
Patient:   EZRA CASE            MRN: [id number]            FIN: [id number]               Age:   23 years     Sex:  Female     :  1995   Associated Diagnoses:   RENETTA (generalized anxiety disorder); Mild major depression; Bronchitis; RENETTA (generalized anxiety disorder); Bronchitis; Mild major depression   Author:   Maritza Valle      Visit Information      Date of Service: 10/03/2018 02:51 pm  Performing Location: [facility]  Encounter#: [id number]      Chief Complaint   10/3/2018 3:11 PM CDT    anxiety. URI      History of Present Illness   Chief complaint reviewed and confirmed with patient. Pt reports increasing axiety over the last month. Reports nausea and vomiting related to anxiety for past 2 weeks, improving over last 3 days, now able to keep down food. Reports home life with mother is strained, moved out of mom's house on  and in with aunt. She reports feeling safe with her aunt. Reports waking up off and on during the night, mind racing.  She has used meds in past for anxiety. Was hospitalized last summer for psychosis, within 6 months after .  She restarted  vistaril she was given at hospitalization   .  No current thoughts of self harm.  See PHQ 9 and RENETTA 7.     Recently quit smoking, 3 days without cigarettes. Additionally reports a dry cough for last month that is now improving. She is running a fever, no ear pain, no sore throat, no OTC for cough      Review of Systems   Constitutional:  Chills, Weakness.    Eye   Ear/Nose/Mouth/Throat:  Negative.    Respiratory:  Negative except as documented in history of present illness.    Cardiovascular:  Negative.    Gastrointestinal:  Negative except as documented in history of present illness.    Genitourinary:  Negative.    Musculoskeletal:  Negative.    Integumentary:  Negative.    Psychiatric:  Negative except as documented in history of present illness.       Health Status   Allergies:    Allergic Reactions  (All)  No Known Medication Allergies   Medications:  (Selected)   Prescriptions  Prescribed  Azithromycin 5 Day Dose Pack 250 mg oral tablet: 500mg day 1, 250 mg day 2-5, PO, Daily, # 6 tab(s), 0 Refill(s), Type: Maintenance, Pharmacy: [facility] [id number] [facility], 500mg day 1, 250 mg day 2-5 Oral daily  Vistaril 25 mg oral capsule: See Instructions, Instructions: 1-2  cap(s) PO QID if needed, PRN: for anxiety, # 50 tab(s), 1 Refill(s), Type: Maintenance, Pharmacy: [facility] [id number] [facility], 1-2  cap(s) PO QID if needed,PRN:for anxiety  Renetta 3 mg-0.02 mg oral tablet: 1 tab(s), Oral, daily, # 84 tab(s), 3 Refill(s), Type: Maintenance, Pharmacy: [facility], generic, 1 tab(s) Oral daily  sertraline 50 mg oral tablet: = 1 tab(s) ( 50 mg ), PO, Daily, # 30 tab(s), 1 Refill(s), Type: Maintenance, Pharmacy: [facility], 1 tab(s) Oral daily,    Medications          *denotes recorded medication          Azithromycin 5 Day Dose Pack 250 mg oral tablet: 500mg day 1, 250 mg day 2-5, PO, Daily, 6 tab(s), 0 Refill(s).          Renetta 3 mg-0.02 mg oral tablet: 1 tab(s), Oral, daily, 84 tab(s), 3 Refill(s).          Vistaril 25 mg oral capsule: See Instructions, 1-2  cap(s) PO QID if needed, PRN: for anxiety, 50 tab(s), 1 Refill(s).          sertraline 50 mg oral tablet: 50 mg, 1 tab(s), PO, Daily, 30 tab(s), 1 Refill(s).     Problem list:    All Problems (Selected)  Cannabis use with psychotic disorder / SNOMED CT 4743237933 / Confirmed  Chemical dependency / SNOMED CT 654784938 / Confirmed  Psychotic disorder / SNOMED CT 239797292 / Confirmed  Alcohol use disorder, severe, dependence / SNOMED CT 2386421199 / Confirmed  Tobacco user / SNOMED CT 556144464 / Probable      Histories   Past Medical History:    Active  Psychotic disorder (250039477)  Cannabis use with psychotic disorder (4561757844)  Alcohol use disorder, severe, dependence (7117286928)  Chemical dependency (657231878)  Resolved  Inpatient stay (788867232):  Onset on 7/14/2017 at 22 years.  Resolved on 8/7/2017 at 22 years.  Comments:  8/22/2017 CDT 7:49 AM CDT - Gemini Morris  @[facility], [place] - Severe episode of recurrent major depressive disorder, with psychotic features.  Inpatient stay (846538079): Onset on 6/21/2017 at 21 years.  Resolved on 7/6/2017 at 21 years.  Comments:  7/17/2017 CDT 8:24 AM MICHAELT - Gemini Morris  @[facility] - Psychotic disorder not otherwise specified  Pregnancy (180344845):  Resolved in the month of 2/2017 at 21 years.   Family History:    High blood pressure  Father  Grandfather (M)        Physical Examination   Vital Signs   10/3/2018 3:11 PM CDT Temperature Tympanic 100 DegF    Peripheral Pulse Rate 97 bpm    HR Method Electronic    Systolic Blood Pressure 130 mmHg    Diastolic Blood Pressure 80 mmHg    Mean Arterial Pressure 97 mmHg    BP Site Right arm    BP Method Manual    Oxygen Saturation 97 %      Measurements from flowsheet : Measurements   10/3/2018 3:11 PM CDT    Weight Measured - Standard                122 lb     General:  Alert and oriented, No acute distress.    HENT:  Normocephalic, Tympanic membranes are clear, Oral mucosa is moist, No pharyngeal erythema, No sinus tenderness.    Neck:  Supple, Non-tender, No lymphadenopathy, No thyromegaly.    Respiratory:  Lungs are clear to auscultation, Respirations are non-labored, Symmetrical chest wall expansion.    Cardiovascular:  Normal rate, Regular rhythm.    Gastrointestinal:  Soft, Non-tender.    Integumentary:  Warm, Pink.    Neurologic:  Alert, Oriented.    Psychiatric:  Cooperative, Normal judgment, Non-suicidal.       Impression and Plan   Diagnosis     RENETTA (generalized anxiety disorder) (WGH87-MD F41.1).     Mild major depression (SHC58-MF F32.0).     Bronchitis (DFO71-RU J40).     Plan:  Sertraline for depression and anxiety, PHQ-9 score 16.  Hydroxyzine PRN for anxiety, RENETTA-7 score 17.  Z-pack for bronchitis.  Pt encouraged to see a counselor in the  area to discuss family disagreements and manage depression/anxiety, written information on local resources given to patient.   Encouraged to journal at night, utilize meditation, continue to abstain from alcohol, cigarette, and illicit drug use.   GERD educational information give to patient per request for history of struggles with gastric reflux. .    Orders     Orders (Selected)   Prescriptions  Prescribed  Azithromycin 5 Day Dose Pack 250 mg oral tablet: 500mg day 1, 250 mg day 2-5, PO, Daily, # 6 tab(s), 0 Refill(s), Type: Maintenance, Pharmacy: [facility], 500mg day 1, 250 mg day 2-5 Oral daily  Vistaril 25 mg oral capsule: See Instructions, Instructions: 1-2  cap(s) PO QID if needed, PRN: for anxiety, # 50 tab(s), 1 Refill(s), Type: Maintenance, Pharmacy: [facility], 1-2  cap(s) PO QID if needed,PRN:for anxiety  sertraline 50 mg oral tablet: = 1 tab(s) ( 50 mg ), PO, Daily, # 30 tab(s), 1 Refill(s), Type: Maintenance, Pharmacy: [facility], 1 tab(s) Oral daily.

## 2022-02-16 NOTE — TELEPHONE ENCOUNTER
---------------------  From: Nelly Brody LPN (Phone Messages Pool (32224_[facility]))   To: JULIEN Message Pool ([address]_[place] [facility] [place]);     Sent: 5/3/2019 3:13:38 PM CDT  Subject: Birth Control       Phone Message    PCP: JULIEN/DESEAN    Time of call: 2:56pm message left    Person calling: Johana pharmacist with [facility]  Contact # : [phone]    MESSAGE: Pharmacy is looking for an rx for birth control - Aviane. The facility that the patient is living at [facility] says that the patient is out of pills.    Last visit/reason: 10/10/18 - anxiety    I see that the Rx was sent to [facility] today. Can you please resend to [facility] and confirm the quantity because right now it is for #46 which does not come out right.new script sent.

## 2022-02-16 NOTE — PROGRESS NOTES
Chief Complaint        f/u anxiety      History of Present Illness           10/3/2018 Chief complaint reviewed and confirmed with patient. Pt reports increasing axiety over the last month. Reports nausea and vomiting related to anxiety for past 2 weeks, improving over last 3 days, now able to keep down food. Reports home life with mother is strained, moved out of mom's house on  and in with aunt. She reports feeling safe with her aunt. Reports waking up off and on during the night, mind racing.  She has used meds in past for anxiety. Was hospitalized last summer for psychosis, within 6 months after .  She restarted  vistaril she was given at hospitalization   .  No current thoughts of self harm.  See PHQ 9 and RENETTA 7.                          Recently quit smoking, 3 days without cigarettes. Additionally reports a dry cough for last month that is now improving. She is running a fever, no ear pain, no sore throat, no OTC for cough                      10/10/2018          back for recheck, feels sertraline helping a bit after 1 week use. She does use vistaril about 3x/day.  She has had two odd episodes of thinking she heard something that wasnt there          1. lives iwth aunt, thought her aunt was calling out but she was only watching tv          2. out in a crowd, thought everyone around her was talking about her          after further discussion, she is worried she is hearing voices. During her hospitalization she believes it was dx as psychosis related substance use. She has cut back a great deal, still 3 doses pot in the evening, rare ETOH. She saw psychiatry a few times after discharged but felt so poorly on the meds stopped them. No thoughts of self harm or of harming others      Physical Exam       Vitals & Measurements        T: 98.5   F (Tympanic)  HR: 88(Peripheral)  BP: 148/88         HT: 62.25 in  WT: 120.6 lb  BMI: 21.88           a/ox3, NAD, some tears with conversation, see  As per patient "I have pain on my left side going to my back since 10/1/2020, I had a colposcopy on 9/30/2020 for similar pain" PHQ 9      Assessment/Plan           RENETTA (generalized anxiety disorder) (F41.1)              certainly could be hearing voices, but need to give medication some time to work and get records from previous provider, she will sign ZACHARY today and see me in 1 month, sooner if any worsening.           Orders:             hydrOXYzine, See Instructions, Instructions: 1-2 cap(s) PO QID if needed, PRN: for anxiety, # 50 tab(s), 0 Refill(s), Type: Maintenance, [id number], 1-2 cap(s) PO QID if needed,PRN:for anxiety, (Ordered)             sertraline, = 1 tab(s) ( 100 mg ), Oral, daily, # 30 tab(s), 0 Refill(s), Type: Maintenance, [id number], 1 tab(s) Oral daily, (Ordered)      Patient Information         Name:EZRA CASE          Address:          [address] [place] [id number]         Sex:Female         YOB: 1995         [phone]         Emergency Contact:DECLINED, UNKNOWN         MRN:[id number]         FIN:[id number]         Location:[facility]         Date of Service:10/10/2018          Primary Care Physician:           Ronal Peters MD, [phone]          Attending Physician:           Maritza Valle, [phone]      Problem List/Past Medical History        Ongoing         Alcohol use disorder, severe, dependence         Cannabis use with psychotic disorder         Chemical dependency         Polysubstance abuse         Psychotic disorder         Tobacco user        Historical         Inpatient stay           Comments: @[facility], [place] - Severe episode of recurrent major depressive disorder, with psychotic features.         Inpatient stay           Comments: @[facility] - Psychotic disorder not otherwise specified         Pregnancy      Procedure/Surgical History         No previous hospitalizations                Medications         Renetta 3 mg-0.02 mg oral tablet: 1 tab(s), Oral, daily, 84  tab(s), 3 Refill(s).         sertraline 50 mg oral tablet: 50 mg, 1 tab(s), PO, Daily, 30 tab(s), 1 Refill(s).         sertraline 100 mg oral tablet: 100 mg, 1 tab(s), Oral, daily, 30 tab(s), 0 Refill(s).         Vistaril 25 mg oral capsule: See Instructions, 1-2  cap(s) PO QID if needed, PRN: for anxiety, 50 tab(s), 0 Refill(s).                      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 Contraceptive Use Details: Condoms. Other contraceptive use: h/o ocp use., 05/01/2018         Tobacco          Current every day smoker, Cigarettes, 5 per day., 03/10/2016      Family History        High blood pressure: Father and Grandfather (M).        Mother: History is negative      Immunizations          Vaccine Date Status          tetanus/diphth/pertuss (Tdap) adult/adol 05/01/2018 Given          human papillomavirus vaccine 10/06/2010 Recorded          human papillomavirus vaccine 04/30/2010 Recorded          human papillomavirus vaccine 02/24/2010 Recorded          tetanus/diphth/pertuss (Tdap) adult/adol 08/29/2008 Recorded          varicella 08/11/1995 Recorded          DTaP-Hib 01/13/1995 Recorded  [1] Anxiety + bronchitis; Sherwin PIERSON, Maritza 10/03/2018 16:11 CDT

## 2022-02-16 NOTE — PROGRESS NOTES
Chief Complaint    Annual physical exam.  History of Present Illness      patient present to clinic today for annual wellness exam and to resume OCP.  She has been using condoms for contraception.  She denies possibility of pregnancy and declines UPT today.  Is aware of various alternatives for contraception including nexplanon, OCP, nuvaring, patch, Depo Provera, IUD and IUS, NFP, diaphragm, condoms, abstinence and sterilization. She is aware of risks associated with estrogen including stroke, DVT, PE, CVA, MI and would like to proceed with OCP.  She has used these in the past  and would like to resume them.  Also counseled patient that all patients of reproductive age should be taking 400 mcg folic acid daily to reduce risks of 2 birth defects.      reports her mood is better now that she is not using drugs as often      Review of systems is negative except as per HPI including:  no fevers, chills, sore throat, runny nose, nausea, vomiting, constipation, diarrhea, rash or new skin lesions, chest pain, palpitations, slurred speech, new paresthesia, shortness of breath or wheeze. she also denies and genital lesions or sores or discharge or itching, no pelvic pain.      Exam:      General: alert and oriented ×3 no acute distress.      HEENT: pupils are equal round and reactive to light extraocular motion is intact. Normocephalic and atraumatic.       Hearing is grossly normal and there is no otorrhea.       Nares are patent there is no rhinorrhea.       Mucous membranes are moist and pink.      Chest: has bilateral rise with no increased work of breathing.      Cardiovascular: normal perfusion and brisk capillary refill.      Musculoskeletal: no gross focal abnormalities and normal gait.      Neuro: no gross focal abnormalities and memory seems intact.      Psychiatric: speech is clear and coherent and fluent. Patient dressed appropriately for the weather. Mood is appropriate and affect is full.                      Discussed with patient to return to clinic if symptoms worsen or do not improve, use condoms for back up for the first month to reduce risk of pregnancy and always use condoms to reduce risk of STD transmission.   Physical Exam   Vitals & Measurements    T: 98.3   F (Tympanic)  HR: 85(Peripheral)  BP: 108/76  SpO2: 97%     HT: 62.25 in  WT: 132.6 lb  BMI: 24.06   Assessment/Plan       Contraception (Z30.011)         Orders:          drospirenone-ethinyl estradiol, 1 tab(s), Oral, daily, # 84 tab(s), 3 Refill(s), Type: Maintenance, [id number], 1 tab(s) Oral daily, (Ordered)          93896 periodic preventive med est patient 18-39 yrs (Charge), Quantity: 1, Well adult exam  Contraception  Tobacco user                Tobacco user (Z72.0)          encouraged cessation         Orders:          36888 periodic preventive med est patient 18-39 yrs (Charge), Quantity: 1, Well adult exam  Contraception  Tobacco user                Well adult exam (Z00.00)         Orders:          98823 periodic preventive med est patient 18-39 yrs (Charge), Quantity: 1, Well adult exam  Contraception  Tobacco user           Patient Information     Name:EZRA CASE      Address:      [address] [place] [id number]     Sex:Female     YOB: 1995     [phone]     Emergency Contact:DECLINED, UNKNOWN     MRN:[id number]     FIN:[id number]     Location:[facility]     Date of Service:07/11/2018      Primary Care Physician:       Ronal Peters MD, [phone]      Attending Physician:       Yelitza Champagne MD, [phone]  Problem List/Past Medical History    Ongoing     Alcohol use disorder, severe, dependence     Cannabis use with psychotic disorder     Chemical dependency     Polysubstance abuse     Psychotic disorder     Tobacco user    Historical     Inpatient stay       Comments: @[facility], [place] - Severe episode of recurrent major  depressive disorder, with psychotic features.     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 Contraceptive Use Details: Condoms. Other contraceptive use: h/o ocp use., 05/01/2018     Tobacco      Current every day smoker, Cigarettes, 5 per day., 03/10/2016  Family History    High blood pressure: Father and Grandfather (M).    Mother: History is negative  Immunizations      Vaccine Date Status      tetanus/diphth/pertuss (Tdap) adult/adol 05/01/2018 Given      human papillomavirus vaccine 04/30/2010 Recorded      human papillomavirus vaccine 02/24/2010 Recorded      tetanus/diphth/pertuss (Tdap) adult/adol 08/29/2008 Recorded      varicella 08/11/1995 Recorded  Lab Results       Lab Results (Last 4 results within 90 days)        Chlam/N. gonorrhea Comments: See comment (05/01/18 09:03:00 CDT)       Chlamydia RNA: NOT DETECTED (05/01/18 09:03:00 CDT)       N. gonorrhea RNA: NOT DETECTED (05/01/18 09:03:00 CDT)       Wet Prep Yeast: Present (05/01/18 09:07:00 CDT)       Wet Prep Trichomonas: None Seen (05/01/18 09:07:00 CDT)       Wet Prep Clue Cells: None Seen (05/01/18 09:07:00 CDT)

## 2022-02-16 NOTE — PROGRESS NOTES
Patient:   EZRA CASE            MRN: [id number]            FIN: [id number]               Age:   22 years     Sex:  Female     :  1995   Associated Diagnoses:   GERD (gastroesophageal reflux disease)   Author:   Rylan Diggs MD      Visit Information      Date of Service: 2018 05:20 pm  Performing Location: [facility]  Encounter#: [id number]      Primary Care Provider (PCP):  Ronal Peters MD    NPI# [id number]      Referring Provider:  Rylan Diggs MD    NPI# [id number]      Chief Complaint   2018 6:20 PM CDT    discuss GERD, has been going for the past 2 months        History of Present Illness   Has been  vomiting for past two months with yellow stomach acid. Has been vomiting when wakes up. Happens about once a week. Everyday feels nauseated intermittently throughout the day for about 15 minutes at a time. Denies hematemesis and dysphagia. Denies heartburn. Tums has not helped. Not triggered by meals or fatty foods. Appetite still good. No weight loss. Had hard time getting weight above 110 lbs until treatment last year and now steadily 130 lbs.    Coffee 2 cups a day. Minimal alcohol. Minimal soda. Smoker. Seldom NSAIDs      Review of Systems   Constitutional:  No fever.    Respiratory:  No shortness of breath.    Cardiovascular:  No chest pain.    Gastrointestinal:  No constipation.    No significant abdominal pain      Health Status   Allergies:    Allergic Reactions (Selected)  No Known Medication Allergies   Medications:  (Selected)      Problem list:    All Problems  Cannabis use with psychotic disorder / SNOMED CT 0130871807 / Confirmed  Chemical dependency / SNOMED CT 538492200 / Confirmed  Polysubstance abuse / SNOMED CT 9983630344 / Confirmed  Psychotic disorder / SNOMED CT 940639942 / Confirmed  Alcohol use disorder, severe, dependence / SNOMED CT 3515266535 / Confirmed  Tobacco user / SNOMED CT 421528544 / Probable  Resolved: Inpatient stay / SNOMED CT  279803049  Resolved: Inpatient stay / SNOMED CT 122513381  Resolved: Pregnancy / SNOMED CT 890200320      Histories   Past Medical History:    Active  Psychotic disorder (746108568)  Cannabis use with psychotic disorder (3075657833)  Alcohol use disorder, severe, dependence (8231419087)  Chemical dependency (512031074)  Polysubstance abuse (9225621118)  Resolved  Inpatient stay (035383734): Onset on 7/14/2017 at 22 years.  Resolved on 8/7/2017 at 22 years.  Comments:  8/22/2017 CDT 7:49 AM CDT - Gemini Morris  @[facility], [place] - Severe episode of recurrent major depressive disorder, with psychotic features.  Inpatient stay (912075769): Onset on 6/21/2017 at 21 years.  Resolved on 7/6/2017 at 21 years.  Comments:  7/17/2017 CDT 8:24 AM MICHAELT - Gemini Morris  @[facility] - Psychotic disorder not otherwise specified  Pregnancy (047202170):  Resolved in the month of 2/2017 at 21 years.   Family History: No esophageal or stomach cancer. Mom with possible Barretts   Procedure history:    No previous hospitalizations.   Social History: Smoker. School for Dental hygienist. Relationship      Physical Examination   Vital Signs   5/23/2018 6:20 PM CDT Temperature Tympanic 99 DegF    Peripheral Pulse Rate 82 bpm    Pulse Site Radial artery    HR Method Manual    Systolic Blood Pressure 118 mmHg    Diastolic Blood Pressure 68 mmHg    Mean Arterial Pressure 85 mmHg    BP Site Right arm    BP Method Manual      Measurements from flowsheet : Measurements   5/23/2018 6:20 PM CDT Height Measured - Standard 62.5 in    Weight Measured - Standard 133.4 lb    BSA 1.63 m2    Body Mass Index 24.01 kg/m2      HENT:  No pharyngeal erythema.    Neck:  No lymphadenopathy.    Respiratory:  Lungs are clear to auscultation.    Cardiovascular:  Normal rate, Regular rhythm.    Gastrointestinal:  Soft, Non-tender, Non-distended.    Musculoskeletal:  Normal range of motion, Normal strength, Normal gait.    Psychiatric:  Appropriate  mood & affect, Normal judgment.       Impression and Plan   Diagnosis     GERD (gastroesophageal reflux disease) (VIL99-GM K21.9).       GERD: discussed GERD diet and would like to start with the dietary changes

## 2023-06-15 ENCOUNTER — LAB REQUISITION (OUTPATIENT)
Dept: LAB | Age: 28
End: 2023-06-15

## 2023-06-15 DIAGNOSIS — Z13.9 ENCOUNTER FOR SCREENING, UNSPECIFIED: ICD-10-CM

## 2023-06-15 LAB — CRP SERPL-MCNC: 0.3 MG/DL

## 2023-06-15 PROCEDURE — PSEU8181 C REACTIVE PROTEIN: Performed by: CLINICAL MEDICAL LABORATORY

## 2023-06-15 PROCEDURE — 86140 C-REACTIVE PROTEIN: CPT | Performed by: CLINICAL MEDICAL LABORATORY

## 2023-06-29 ENCOUNTER — LAB REQUISITION (OUTPATIENT)
Dept: LAB | Age: 28
End: 2023-06-29

## 2023-06-29 DIAGNOSIS — Z13.9 SCREENING FOR UNSPECIFIED CONDITION: ICD-10-CM

## 2023-06-29 LAB — HCYS SERPL-SCNC: 6.4 MCMOL/L

## 2023-06-29 PROCEDURE — 83090 ASSAY OF HOMOCYSTEINE: CPT | Performed by: CLINICAL MEDICAL LABORATORY

## 2023-06-29 PROCEDURE — 84425 ASSAY OF VITAMIN B-1: CPT | Performed by: CLINICAL MEDICAL LABORATORY

## 2023-06-29 PROCEDURE — PSEU8532 HOMOCYSTEINE TOTAL: Performed by: CLINICAL MEDICAL LABORATORY

## 2023-06-29 PROCEDURE — PSEU9879 VITAMIN B1, WHOLE BLOOD: Performed by: CLINICAL MEDICAL LABORATORY

## 2023-07-04 LAB — VIT B1 PYROPHOSHATE BLD-SCNC: 223 NMOL/L (ref 70–180)

## 2025-06-05 NOTE — PROCEDURES
Accession Number:       [id number]  CLINICAL INFORMATION::     None given  LMP::     NONE GIVEN  PREV. PAP::     NONE GIVEN  PREV. BX::     NONE GIVEN  SOURCE::     Vagina, Cervix  STATEMENT OF ADEQUACY::     Satisfactory for evaluation. Endocervical/transformation zone component absent. Age and/or menstrual status not provided  INTERPRETATION/RESULT::     Negative for intraepithelial lesion or malignancy.  COMMENT::     This Pap test has been evaluated with computer assisted technology.  CYTOTECHNOLOGIST::     TORI CARVAJAL(ASCP) CT Screening location: [place] [id number]   Voluntary discussion occurred addressing advanced care planning